# Patient Record
Sex: FEMALE | Race: WHITE | NOT HISPANIC OR LATINO | Employment: OTHER | URBAN - METROPOLITAN AREA
[De-identification: names, ages, dates, MRNs, and addresses within clinical notes are randomized per-mention and may not be internally consistent; named-entity substitution may affect disease eponyms.]

---

## 2018-11-11 ENCOUNTER — HOSPITAL ENCOUNTER (EMERGENCY)
Facility: HOSPITAL | Age: 63
Discharge: ANOTHER HOSPITAL | End: 2018-11-11
Attending: EMERGENCY MEDICINE
Payer: COMMERCIAL

## 2018-11-11 ENCOUNTER — APPOINTMENT (EMERGENCY)
Dept: RADIOLOGY | Facility: HOSPITAL | Age: 63
End: 2018-11-11
Payer: COMMERCIAL

## 2018-11-11 ENCOUNTER — APPOINTMENT (EMERGENCY)
Dept: RADIOLOGY | Facility: HOSPITAL | Age: 63
End: 2018-11-11
Attending: EMERGENCY MEDICINE
Payer: COMMERCIAL

## 2018-11-11 VITALS
BODY MASS INDEX: 29.95 KG/M2 | SYSTOLIC BLOOD PRESSURE: 195 MMHG | RESPIRATION RATE: 18 BRPM | WEIGHT: 169 LBS | DIASTOLIC BLOOD PRESSURE: 79 MMHG | TEMPERATURE: 98.8 F | OXYGEN SATURATION: 96 % | HEIGHT: 63 IN | HEART RATE: 68 BPM

## 2018-11-11 DIAGNOSIS — R52 PAIN: ICD-10-CM

## 2018-11-11 DIAGNOSIS — S72.001A CLOSED RIGHT HIP FRACTURE, INITIAL ENCOUNTER (CMS/HCC): ICD-10-CM
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
## 2018-11-11 LAB
ALBUMIN SERPL-MCNC: 4.1 G/DL (ref 3.4–5)
ALP SERPL-CCNC: 71 IU/L (ref 35–126)
ALT SERPL-CCNC: 18 IU/L (ref 11–54)
ANION GAP SERPL CALC-SCNC: 9 MEQ/L (ref 3–15)
AST SERPL-CCNC: 26 IU/L (ref 15–41)
BASOPHILS # BLD: 0.03 K/UL (ref 0.01–0.1)
BASOPHILS NFR BLD: 0.4 %
BILIRUB SERPL-MCNC: 0.7 MG/DL (ref 0.3–1.2)
BUN SERPL-MCNC: 21 MG/DL (ref 8–20)
CALCIUM SERPL-MCNC: 10.2 MG/DL (ref 8.9–10.3)
CHLORIDE SERPL-SCNC: 104 MEQ/L (ref 98–109)
CO2 SERPL-SCNC: 24 MEQ/L (ref 22–32)
CREAT SERPL-MCNC: 0.9 MG/DL (ref 0.6–1.1)
DIFFERENTIAL METHOD BLD: ABNORMAL
EOSINOPHIL # BLD: 0.03 K/UL (ref 0.04–0.36)
EOSINOPHIL NFR BLD: 0.4 %
ERYTHROCYTE [DISTWIDTH] IN BLOOD BY AUTOMATED COUNT: 13.2 % (ref 11.7–14.4)
GFR SERPL CREATININE-BSD FRML MDRD: >60 ML/MIN/1.73M*2
GLUCOSE SERPL-MCNC: 262 MG/DL (ref 70–99)
HCT VFR BLDCO AUTO: 35.3 % (ref 35–45)
HGB BLD-MCNC: 11.4 G/DL (ref 11.8–15.7)
IMM GRANULOCYTES # BLD AUTO: 0.05 K/UL (ref 0–0.08)
IMM GRANULOCYTES NFR BLD AUTO: 0.7 %
LYMPHOCYTES # BLD: 1.1 K/UL (ref 1.2–3.5)
LYMPHOCYTES NFR BLD: 15.3 %
MCH RBC QN AUTO: 28.6 PG (ref 28–33.2)
MCHC RBC AUTO-ENTMCNC: 32.3 G/DL (ref 32.2–35.5)
MCV RBC AUTO: 88.5 FL (ref 83–98)
MONOCYTES # BLD: 0.52 K/UL (ref 0.28–0.8)
MONOCYTES NFR BLD: 7.3 %
NEUTROPHILS # BLD: 5.44 K/UL (ref 1.7–7)
NEUTS SEG NFR BLD: 75.9 %
NRBC BLD-RTO: 0 %
PDW BLD AUTO: 10.1 FL (ref 9.4–12.3)
PLATELET # BLD AUTO: 154 K/UL (ref 150–369)
POTASSIUM SERPL-SCNC: 3.7 MEQ/L (ref 3.6–5.1)
PROT SERPL-MCNC: 7.2 G/DL (ref 6–8.2)
RBC # BLD AUTO: 3.99 M/UL (ref 3.93–5.22)
SODIUM SERPL-SCNC: 137 MEQ/L (ref 136–144)
WBC # BLD AUTO: 7.17 K/UL (ref 3.8–10.5)

## 2018-11-11 PROCEDURE — 80053 COMPREHEN METABOLIC PANEL: CPT

## 2018-11-11 PROCEDURE — 96374 THER/PROPH/DIAG INJ IV PUSH: CPT

## 2018-11-11 PROCEDURE — 99283 EMERGENCY DEPT VISIT LOW MDM: CPT | Mod: 25

## 2018-11-11 PROCEDURE — 85025 COMPLETE CBC W/AUTO DIFF WBC: CPT | Performed by: EMERGENCY MEDICINE

## 2018-11-11 PROCEDURE — 73562 X-RAY EXAM OF KNEE 3: CPT | Mod: RT

## 2018-11-11 PROCEDURE — 93005 ELECTROCARDIOGRAM TRACING: CPT | Performed by: PHYSICIAN ASSISTANT

## 2018-11-11 PROCEDURE — 36415 COLL VENOUS BLD VENIPUNCTURE: CPT

## 2018-11-11 PROCEDURE — 85025 COMPLETE CBC W/AUTO DIFF WBC: CPT

## 2018-11-11 PROCEDURE — 73502 X-RAY EXAM HIP UNI 2-3 VIEWS: CPT | Mod: RT

## 2018-11-11 PROCEDURE — 71045 X-RAY EXAM CHEST 1 VIEW: CPT

## 2018-11-11 PROCEDURE — 96375 TX/PRO/DX INJ NEW DRUG ADDON: CPT

## 2018-11-11 PROCEDURE — 63600000 HC DRUGS/DETAIL CODE: Performed by: PHYSICIAN ASSISTANT

## 2018-11-11 PROCEDURE — 96376 TX/PRO/DX INJ SAME DRUG ADON: CPT

## 2018-11-11 PROCEDURE — 63700000 HC SELF-ADMINISTRABLE DRUG: Performed by: PHYSICIAN ASSISTANT

## 2018-11-11 PROCEDURE — 27200121 INSERT URINARY CATHETER: Performed by: EMERGENCY MEDICINE

## 2018-11-11 PROCEDURE — 3E033NZ INTRODUCTION OF ANALGESICS, HYPNOTICS, SEDATIVES INTO PERIPHERAL VEIN, PERCUTANEOUS APPROACH: ICD-10-PCS | Performed by: EMERGENCY MEDICINE

## 2018-11-11 PROCEDURE — 80053 COMPREHEN METABOLIC PANEL: CPT | Performed by: EMERGENCY MEDICINE

## 2018-11-11 PROCEDURE — 73564 X-RAY EXAM KNEE 4 OR MORE: CPT | Mod: RT

## 2018-11-11 RX ORDER — FLUOXETINE HYDROCHLORIDE 20 MG/1
20 CAPSULE ORAL DAILY
COMMUNITY

## 2018-11-11 RX ORDER — ASPIRIN 81 MG/1
81 TABLET ORAL DAILY
COMMUNITY

## 2018-11-11 RX ORDER — HYDROMORPHONE HYDROCHLORIDE 1 MG/ML
0.5 INJECTION, SOLUTION INTRAMUSCULAR; INTRAVENOUS; SUBCUTANEOUS ONCE
Status: COMPLETED | OUTPATIENT
Start: 2018-11-11 | End: 2018-11-11

## 2018-11-11 RX ORDER — MYCOPHENOLATE MOFETIL 500 MG/1
500 TABLET ORAL 2 TIMES DAILY
COMMUNITY

## 2018-11-11 RX ORDER — TACROLIMUS 1 MG/1
2 CAPSULE ORAL EVERY EVENING
COMMUNITY

## 2018-11-11 RX ORDER — LOSARTAN POTASSIUM 50 MG/1
50 TABLET ORAL 2 TIMES DAILY
COMMUNITY

## 2018-11-11 RX ORDER — PREDNISONE 5 MG/1
5 TABLET ORAL DAILY
COMMUNITY

## 2018-11-11 RX ORDER — TACROLIMUS 1 MG/1
3 CAPSULE ORAL DAILY
COMMUNITY

## 2018-11-11 RX ORDER — HYDROMORPHONE HYDROCHLORIDE 1 MG/ML
1 INJECTION, SOLUTION INTRAMUSCULAR; INTRAVENOUS; SUBCUTANEOUS ONCE
Status: COMPLETED | OUTPATIENT
Start: 2018-11-11 | End: 2018-11-11

## 2018-11-11 RX ORDER — MORPHINE SULFATE 4 MG/ML
4 INJECTION, SOLUTION INTRAMUSCULAR; INTRAVENOUS ONCE
Status: COMPLETED | OUTPATIENT
Start: 2018-11-11 | End: 2018-11-11

## 2018-11-11 RX ORDER — METHENAMINE HIPPURATE 1000 MG/1
1 TABLET ORAL 2 TIMES DAILY WITH MEALS
COMMUNITY

## 2018-11-11 RX ORDER — BUPRENORPHINE 15 UG/H
1 PATCH TRANSDERMAL
COMMUNITY

## 2018-11-11 RX ORDER — INSULIN GLARGINE 100 [IU]/ML
20 INJECTION, SOLUTION SUBCUTANEOUS 2 TIMES DAILY
COMMUNITY

## 2018-11-11 RX ORDER — PRAVASTATIN SODIUM 20 MG/1
20 TABLET ORAL EVERY EVENING
COMMUNITY

## 2018-11-11 RX ORDER — INSULIN ASPART 100 [IU]/ML
INJECTION, SOLUTION INTRAVENOUS; SUBCUTANEOUS
COMMUNITY

## 2018-11-11 RX ORDER — METOPROLOL SUCCINATE 25 MG/1
12.5 TABLET, EXTENDED RELEASE ORAL EVERY EVENING
COMMUNITY

## 2018-11-11 RX ORDER — LOSARTAN POTASSIUM 50 MG/1
50 TABLET ORAL ONCE
Status: COMPLETED | OUTPATIENT
Start: 2018-11-11 | End: 2018-11-11

## 2018-11-11 RX ORDER — FOLIC ACID 0.4 MG
400 TABLET ORAL DAILY
COMMUNITY

## 2018-11-11 RX ORDER — LANOLIN ALCOHOL/MO/W.PET/CERES
500 CREAM (GRAM) TOPICAL 2 TIMES DAILY
COMMUNITY

## 2018-11-11 RX ORDER — URSODIOL 300 MG/1
300 CAPSULE ORAL EVERY 8 HOURS
COMMUNITY

## 2018-11-11 RX ORDER — OMEPRAZOLE 20 MG/1
40 CAPSULE, DELAYED RELEASE ORAL
COMMUNITY

## 2018-11-11 RX ORDER — FUROSEMIDE 40 MG/1
40 TABLET ORAL DAILY
COMMUNITY

## 2018-11-11 RX ADMIN — HYDROMORPHONE HYDROCHLORIDE 0.5 MG: 1 INJECTION, SOLUTION INTRAMUSCULAR; INTRAVENOUS; SUBCUTANEOUS at 18:42

## 2018-11-11 RX ADMIN — HYDROMORPHONE HYDROCHLORIDE 1 MG: 1 INJECTION, SOLUTION INTRAMUSCULAR; INTRAVENOUS; SUBCUTANEOUS at 19:49

## 2018-11-11 RX ADMIN — MORPHINE SULFATE 4 MG: 4 INJECTION INTRAVENOUS at 22:15

## 2018-11-11 RX ADMIN — MORPHINE SULFATE 4 MG: 4 INJECTION INTRAVENOUS at 20:51

## 2018-11-11 RX ADMIN — LOSARTAN POTASSIUM 50 MG: 50 TABLET, FILM COATED ORAL at 22:14

## 2018-11-11 ASSESSMENT — ENCOUNTER SYMPTOMS
BACK PAIN: 0
NUMBNESS: 0
ABDOMINAL PAIN: 0
LIGHT-HEADEDNESS: 0
FACIAL SWELLING: 0
ARTHRALGIAS: 1
SPEECH DIFFICULTY: 0
NAUSEA: 0
NECK PAIN: 0
MYALGIAS: 1
SHORTNESS OF BREATH: 0
DIZZINESS: 0
NECK STIFFNESS: 0
COLOR CHANGE: 0
WEAKNESS: 0

## 2018-11-11 NOTE — ED PROVIDER NOTES
"HPI     Chief Complaint   Patient presents with   • Fall     This is a 64 yo F who presents to the ED after a fall. Pt has a hx of breast cancer, HTN, liver and kidney transplants, and type 2 DM. Pt reports she was attempting to sit down in her chair when the chair slid out before she sat, causing her to fall onto her right side. Pt notes she heard a \"crack\" in her right hip. Pt denies head injury or LOC at that time. Pt was not able to get up on her own or ambulate after the fall d/t pain. Pt was brought to the ED via EMS. Pt arrives c/o right hip pain that radiates down her right leg to her right knee. Denies numbness, weakness, back pain, neck pain, abdominal pain. No other injuries or complaints. Pt has never previously injured her right hip or right knee; no previous leg surgery.         History provided by:  Patient   used: No         Patient History     Past Medical History:   Diagnosis Date   • Breast cancer (CMS/HCC) (HCC)    • Hypertension    • Kidney transplant recipient    • Lipid disorder    • Liver transplant recipient (CMS/HCC) (HCC)    • Liver transplant rejection (CMS/HCC) (HCC)    • Type 2 diabetes mellitus (CMS/HCC) (HCC)        Past Surgical History:   Procedure Laterality Date   • CHOLECYSTECTOMY     • KIDNEY TRANSPLANT     • LIVER TRANSPLANTATION     • MASTECTOMY         History reviewed. No pertinent family history.    Social History   Substance Use Topics   • Smoking status: Never Smoker   • Smokeless tobacco: Never Used   • Alcohol use No       Systems Reviewed from Nursing Triage:          Review of Systems     Review of Systems   HENT: Negative for dental problem, facial swelling and nosebleeds.         No head injury.   Eyes: Negative for visual disturbance.   Respiratory: Negative for chest tightness and shortness of breath.    Cardiovascular: Negative for chest pain.   Gastrointestinal: Negative for abdominal pain, nausea and vomiting.   Musculoskeletal: Positive for " "arthralgias, gait problem and myalgias. Negative for back pain, neck pain and neck stiffness.   Skin: Negative for color change, pallor, rash and wound.   Neurological: Negative for dizziness, speech difficulty, weakness, light-headedness and numbness.        No LOC.   All other systems reviewed and are negative.       Physical Exam     ED Triage Vitals [11/11/18 1805]   Temp Heart Rate Resp BP SpO2   36.9 °C (98.5 °F) 60 18 (!) 213/85 97 %      Temp Source Heart Rate Source Patient Position BP Location FiO2 (%) (Set)   Temporal -- -- -- --                     Patient Vitals for the past 24 hrs:   BP Temp Temp src Pulse Resp SpO2 Height Weight   11/11/18 1805 (!) 213/85 36.9 °C (98.5 °F) Temporal 60 18 97 % 1.6 m (5' 3\") 76.7 kg (169 lb)           Physical Exam   Constitutional: She is oriented to person, place, and time. She appears well-developed and well-nourished. She appears distressed (appears in pain).   HENT:   Head: Normocephalic and atraumatic.   Right Ear: External ear normal.   Left Ear: External ear normal.   Nose: Nose normal.   Eyes: Conjunctivae are normal. Pupils are equal, round, and reactive to light.   Neck: Normal range of motion.   Cardiovascular: Normal rate, regular rhythm and intact distal pulses.    Murmur heard.  Pulses:       Dorsalis pedis pulses are 2+ on the right side, and 2+ on the left side.   Pulmonary/Chest: Effort normal and breath sounds normal. No respiratory distress. She has no wheezes.   Abdominal: Soft. There is no tenderness.   Musculoskeletal: She exhibits tenderness.        Right hip: She exhibits decreased range of motion and tenderness.        Right knee: Normal. No tenderness found.        Right ankle: Normal. No tenderness.   RLE is externally rotated.   Right hip tenderness.   Distal sensation intact.   +1 pedal pulses bilaterally.  No right knee or right ankle tenderness.   Neurological: She is alert and oriented to person, place, and time. No sensory deficit. "   Skin: Skin is warm and dry. No rash noted.   Psychiatric: She has a normal mood and affect. Her behavior is normal. Judgment and thought content normal.   Nursing note and vitals reviewed.           Procedures    ED Course & MDM     Labs Reviewed   CBC AND DIFFERENTIAL    Narrative:     The following orders were created for panel order CBC and differential.  Procedure                               Abnormality         Status                     ---------                               -----------         ------                     CBC[[id number]]                                                                          Diff Count[[id number]]                                                                     Please view results for these tests on the individual orders.   COMPREHENSIVE METABOLIC PANEL   RAINBOW DRAW PANEL    Narrative:     The following orders were created for panel order [place] Draw Panel.  Procedure                               Abnormality         Status                     ---------                               -----------         ------                     RAINBOW RED[[id number]]                                                                  RAINBOW LT BLUE[[id number]]                                                              RAINBOW LT GREEN[[id number]]                                                             RAINBOW GOLD[[id number]]                                                                   Please view results for these tests on the individual orders.   CBC   DIFF COUNT   RAINBOW RED   RAINBOW LT BLUE   RAINBOW LT GREEN   RAINBOW GOLD       No orders to display               MDM  Number of Diagnoses or Management Options  Closed right hip fracture, initial encounter (CMS/[facility]) ([facility]):   Fall, initial encounter:      Amount and/or Complexity of Data Reviewed  Clinical lab tests: reviewed  Discuss the patient with other providers: yes  Independent visualization of images, tracings, or  specimens: yes    Risk of Complications, Morbidity, and/or Mortality  Presenting problems: moderate  Diagnostic procedures: moderate  Management options: moderate    Patient Progress  Patient progress: stable         I: R hip/leg injury s/p fall   P: labs, XR, dilaudid for pain, observe  6:44 PM     XR shows comminuted displaced femoral neck fracture. Reviewed w/Dr. Guerrero, will give 1mg of dilaudid.   7:40 PM     Clinical Impressions as of Nov 11 2203   Fall, initial encounter   Closed right hip fracture, initial encounter (CMS/[facility]) ([facility])      Pt is requesting transfer to [facility]. States she had transplants at [place] but all of her care is at [place] now. Will contact transfer center.   8:00 PM     Pt accepted by Dr. Gomez on the trauma service at [facility] at [facility]. Pt informed of transport time of 1045 and family and pt are in agreement w/plan. Will give morphine for pain.   8:34 PM     Pt continues to be hypertensive, will give home dose of cozaar 50mg and 4mg of morphine. Will continue to monitor while awaiting transport.   10:03 PM     Spoke with [facility] transfer center, [facility] present in ED to transport pt at this time.   10:52 PM     By signing my name below, I, Teagan Loera, attest that this documentation has been prepared under the direction and in the presence of MG Light PA-C.  11/11/2018 6:38 PM    I, Maggi Light, personally performed the services described in this documentation, as documented by the scribe in my presence, and it is both accurate and complete.  11/12/2018 12:59 AM       Teagan Loera  11/11/18 1846       Maggi Light PA C  11/12/18 0102       Maggi Light PA C  11/12/18 0103

## 2018-11-11 NOTE — ED PROVIDER NOTES
"HPI     Chief Complaint   Patient presents with   • Fall       HPI     Patient History     Past Medical History:   Diagnosis Date   • Breast cancer (CMS/HCC) (HCC)    • Hypertension    • Kidney transplant recipient    • Lipid disorder    • Liver transplant recipient (CMS/HCC) (HCC)    • Liver transplant rejection (CMS/HCC) (HCC)    • Type 2 diabetes mellitus (CMS/HCC) (HCC)        Past Surgical History:   Procedure Laterality Date   • CHOLECYSTECTOMY     • KIDNEY TRANSPLANT     • LIVER TRANSPLANTATION     • MASTECTOMY         History reviewed. No pertinent family history.    Social History   Substance Use Topics   • Smoking status: Never Smoker   • Smokeless tobacco: Never Used   • Alcohol use No       Systems Reviewed from Nursing Triage:          Review of Systems     Review of Systems     Physical Exam     ED Triage Vitals [11/11/18 1805]   Temp Heart Rate Resp BP SpO2   36.9 °C (98.5 °F) 60 18 (!) 213/85 97 %      Temp Source Heart Rate Source Patient Position BP Location FiO2 (%) (Set)   Temporal -- -- -- --                     Patient Vitals for the past 24 hrs:   BP Temp Temp src Pulse Resp SpO2 Height Weight   11/11/18 1805 (!) 213/85 36.9 °C (98.5 °F) Temporal 60 18 97 % 1.6 m (5' 3\") 76.7 kg (169 lb)           Physical Exam         Procedures    ED Course & MDM     Labs Reviewed   CBC - Abnormal        Result Value    WBC 7.17      RBC 3.99      Hemoglobin 11.4 (*)     Hematocrit 35.3      MCV 88.5      MCH 28.6      MCHC 32.3      RDW 13.2      Platelets 154      MPV 10.1     DIFF COUNT - Abnormal     Differential Type Auto      nRBC 0.0      Immature Granulocytes 0.7      Neutrophils 75.9      Lymphocytes 15.3      Monocytes 7.3      Eosinophils 0.4      Basophils 0.4      Immature Granulocytes, Absolute 0.05      Neutrophils, Absolute 5.44      Lymphocytes, Absolute 1.10 (*)     Monocytes, Absolute 0.52      Eosinophils, Absolute 0.03 (*)     Basophils, Absolute 0.03     CBC AND DIFFERENTIAL    " Narrative:     The following orders were created for panel order CBC and differential.  Procedure                               Abnormality         Status                     ---------                               -----------         ------                     CBC[[id number]]                           Abnormal            Final result               Diff Count[[id number]]                    Abnormal            Final result                 Please view results for these tests on the individual orders.   COMPREHENSIVE METABOLIC PANEL   RAINBOW DRAW PANEL    Narrative:     The following orders were created for panel order [place] Draw Panel.  Procedure                               Abnormality         Status                     ---------                               -----------         ------                     RAINBOW RED[[id number]]                                       In process                 RAINBOW LT BLUE[[id number]]                                   In process                 RAINBOW LT GREEN[[id number]]                                  In process                 RAINBOW GOLD[[id number]]                                      In process                   Please view results for these tests on the individual orders.   RAINBOW RED   RAINBOW LT BLUE   RAINBOW LT GREEN   RAINBOW GOLD       X-RAY KNEE RIGHT 4+ VIEWS    (Results Pending)   X-RAY HIP WITH OR WITHOUT PELVIS 2-3 VW RIGHT    (Results Pending)               MDM        I: R hip/leg injury s/p fall   P: labs, XR, dilaudid for pain, observe  6:44 PM

## 2018-11-12 LAB
ATRIAL RATE: 62
P AXIS: 70
PR INTERVAL: 154
QRS DURATION: 82
QT INTERVAL: 462
QTC CALCULATION(BAZETT): 468
R AXIS: 25
RAINBOW HOLD SPECIMEN: NORMAL
T WAVE AXIS: 32
VENTRICULAR RATE: 62

## 2018-11-12 ASSESSMENT — ENCOUNTER SYMPTOMS
CHEST TIGHTNESS: 0
WOUND: 0
VOMITING: 0

## 2018-11-12 NOTE — ED ATTESTATION NOTE
11/11/20188:28 PM  I have personally seen and examined the patient.  I reviewed and agree with the PA/NP/Resident's assessment and plan of care.    My examination, assessment, and plan of care of Grisel Boston is as follows:  The patient presents with fall and right hip pain.  Patient states she has a history of breast cancer as well as a liver and renal transplant.  These 2 transplants were done at [place] and she is cared for by specialists at [facility] in [place].  She states that she was trying undresses at [employer] and she fell off of the bench in the changing room and landed on her right hip.  She was unable to ambulate thereafter.  She banged her right arm but her arm is moving well.  She has no other injuries.      Exam: Is awake alert and oriented x3 and in moderate distress secondary to right hip pain.  Heart is regular with no murmurs.  Lungs are clear to auscultation bilaterally.  Abdomen is soft and nontender with good bowel sounds.  Pelvis is stable.  The right and left upper extremity move well and has no pain with palpation.  The right lower extremity has pain in the right hip with soft tissue swelling and pain with range of motion.  There is no open area.  The knee tib-fib ankle and foot are all nontender.  The distal neurovascular exam is normal.  The right lower extremity is shortened and externally rotated.      Impression/Plan: Patient sustained a fall with a comminuted right hip fracture for which she will need hospital admission and ORIF.  At this time we had placed a call to orthopedics and were planning on admitting to [facility] here however the patient requests to be transferred to [facility].  We will make several phone calls to arrange transportation to [facility] where she can be cared for by Dr's  known to her.     I was physically present for the key/critical portions of the following procedures: None     Obed Guerrero,   11/11/18 2031

## 2020-12-08 ENCOUNTER — APPOINTMENT (EMERGENCY)
Dept: RADIOLOGY | Facility: HOSPITAL | Age: 65
End: 2020-12-08

## 2020-12-08 ENCOUNTER — HOSPITAL ENCOUNTER (EMERGENCY)
Facility: HOSPITAL | Age: 65
Discharge: ANOTHER HOSPITAL | End: 2020-12-08
Attending: EMERGENCY MEDICINE | Admitting: EMERGENCY MEDICINE
Payer: COMMERCIAL

## 2020-12-08 VITALS
DIASTOLIC BLOOD PRESSURE: 79 MMHG | TEMPERATURE: 97.6 F | RESPIRATION RATE: 18 BRPM | HEART RATE: 69 BPM | OXYGEN SATURATION: 97 % | SYSTOLIC BLOOD PRESSURE: 167 MMHG

## 2020-12-08 DIAGNOSIS — R55 PRE-SYNCOPE: ICD-10-CM

## 2020-12-08 DIAGNOSIS — R03.0 ELEVATED BLOOD PRESSURE READING: ICD-10-CM

## 2020-12-08 DIAGNOSIS — I48.91 ATRIAL FIBRILLATION WITH RAPID VENTRICULAR RESPONSE (HCC): ICD-10-CM

## 2020-12-08 DIAGNOSIS — I95.9 HYPOTENSION: ICD-10-CM

## 2020-12-08 DIAGNOSIS — R53.1 GENERALIZED WEAKNESS: Primary | ICD-10-CM

## 2020-12-08 LAB
ALBUMIN SERPL BCP-MCNC: 3.6 G/DL (ref 3.5–5)
ALP SERPL-CCNC: 80 U/L (ref 46–116)
ALT SERPL W P-5'-P-CCNC: 19 U/L (ref 12–78)
ANION GAP SERPL CALCULATED.3IONS-SCNC: 9 MMOL/L (ref 4–13)
AST SERPL W P-5'-P-CCNC: 18 U/L (ref 5–45)
ATRIAL RATE: 117 BPM
ATRIAL RATE: 271 BPM
BACTERIA UR QL AUTO: ABNORMAL /HPF
BASOPHILS # BLD AUTO: 0.01 THOUSANDS/ΜL (ref 0–0.1)
BASOPHILS NFR BLD AUTO: 0 % (ref 0–1)
BILIRUB SERPL-MCNC: 0.3 MG/DL (ref 0.2–1)
BILIRUB UR QL STRIP: NEGATIVE
BUN SERPL-MCNC: 32 MG/DL (ref 5–25)
CALCIUM SERPL-MCNC: 9.5 MG/DL (ref 8.3–10.1)
CHLORIDE SERPL-SCNC: 101 MMOL/L (ref 100–108)
CLARITY UR: CLEAR
CO2 SERPL-SCNC: 25 MMOL/L (ref 21–32)
COLOR UR: YELLOW
CREAT SERPL-MCNC: 1.1 MG/DL (ref 0.6–1.3)
EOSINOPHIL # BLD AUTO: 0.01 THOUSAND/ΜL (ref 0–0.61)
EOSINOPHIL NFR BLD AUTO: 0 % (ref 0–6)
ERYTHROCYTE [DISTWIDTH] IN BLOOD BY AUTOMATED COUNT: 14.7 % (ref 11.6–15.1)
GFR SERPL CREATININE-BSD FRML MDRD: 52 ML/MIN/1.73SQ M
GLUCOSE SERPL-MCNC: 177 MG/DL (ref 65–140)
GLUCOSE UR STRIP-MCNC: NEGATIVE MG/DL
HCT VFR BLD AUTO: 39.1 % (ref 34.8–46.1)
HGB BLD-MCNC: 12.2 G/DL (ref 11.5–15.4)
HGB UR QL STRIP.AUTO: ABNORMAL
IMM GRANULOCYTES # BLD AUTO: 0.02 THOUSAND/UL (ref 0–0.2)
IMM GRANULOCYTES NFR BLD AUTO: 1 % (ref 0–2)
KETONES UR STRIP-MCNC: NEGATIVE MG/DL
LEUKOCYTE ESTERASE UR QL STRIP: ABNORMAL
LYMPHOCYTES # BLD AUTO: 0.82 THOUSANDS/ΜL (ref 0.6–4.47)
LYMPHOCYTES NFR BLD AUTO: 20 % (ref 14–44)
MAGNESIUM SERPL-MCNC: 1.7 MG/DL (ref 1.6–2.6)
MCH RBC QN AUTO: 27.5 PG (ref 26.8–34.3)
MCHC RBC AUTO-ENTMCNC: 31.2 G/DL (ref 31.4–37.4)
MCV RBC AUTO: 88 FL (ref 82–98)
MONOCYTES # BLD AUTO: 0.35 THOUSAND/ΜL (ref 0.17–1.22)
MONOCYTES NFR BLD AUTO: 8 % (ref 4–12)
NEUTROPHILS # BLD AUTO: 2.96 THOUSANDS/ΜL (ref 1.85–7.62)
NEUTS SEG NFR BLD AUTO: 71 % (ref 43–75)
NITRITE UR QL STRIP: NEGATIVE
NON-SQ EPI CELLS URNS QL MICRO: ABNORMAL /HPF
NRBC BLD AUTO-RTO: 0 /100 WBCS
OTHER STN SPEC: ABNORMAL
PH UR STRIP.AUTO: 6 [PH]
PLATELET # BLD AUTO: 113 THOUSANDS/UL (ref 149–390)
PMV BLD AUTO: 11 FL (ref 8.9–12.7)
POTASSIUM SERPL-SCNC: 3.6 MMOL/L (ref 3.5–5.3)
PROT SERPL-MCNC: 7.7 G/DL (ref 6.4–8.2)
PROT UR STRIP-MCNC: ABNORMAL MG/DL
QRS AXIS: 39 DEGREES
QRS AXIS: 48 DEGREES
QRSD INTERVAL: 100 MS
QRSD INTERVAL: 82 MS
QT INTERVAL: 326 MS
QT INTERVAL: 380 MS
QTC INTERVAL: 462 MS
QTC INTERVAL: 530 MS
RBC # BLD AUTO: 4.43 MILLION/UL (ref 3.81–5.12)
RBC #/AREA URNS AUTO: ABNORMAL /HPF
SODIUM SERPL-SCNC: 135 MMOL/L (ref 136–145)
SP GR UR STRIP.AUTO: 1.01 (ref 1–1.03)
T WAVE AXIS: -29 DEGREES
T WAVE AXIS: -72 DEGREES
TROPONIN I SERPL-MCNC: 0.04 NG/ML
TSH SERPL DL<=0.05 MIU/L-ACNC: 2.47 UIU/ML (ref 0.36–3.74)
UROBILINOGEN UR QL STRIP.AUTO: 0.2 E.U./DL
VENTRICULAR RATE: 117 BPM
VENTRICULAR RATE: 121 BPM
WBC # BLD AUTO: 4.17 THOUSAND/UL (ref 4.31–10.16)
WBC #/AREA URNS AUTO: ABNORMAL /HPF

## 2020-12-08 PROCEDURE — 71045 X-RAY EXAM CHEST 1 VIEW: CPT

## 2020-12-08 PROCEDURE — 83735 ASSAY OF MAGNESIUM: CPT | Performed by: EMERGENCY MEDICINE

## 2020-12-08 PROCEDURE — 80053 COMPREHEN METABOLIC PANEL: CPT | Performed by: EMERGENCY MEDICINE

## 2020-12-08 PROCEDURE — 81001 URINALYSIS AUTO W/SCOPE: CPT | Performed by: EMERGENCY MEDICINE

## 2020-12-08 PROCEDURE — 85025 COMPLETE CBC W/AUTO DIFF WBC: CPT | Performed by: EMERGENCY MEDICINE

## 2020-12-08 PROCEDURE — 99285 EMERGENCY DEPT VISIT HI MDM: CPT

## 2020-12-08 PROCEDURE — 87086 URINE CULTURE/COLONY COUNT: CPT | Performed by: EMERGENCY MEDICINE

## 2020-12-08 PROCEDURE — 96360 HYDRATION IV INFUSION INIT: CPT

## 2020-12-08 PROCEDURE — 84443 ASSAY THYROID STIM HORMONE: CPT | Performed by: EMERGENCY MEDICINE

## 2020-12-08 PROCEDURE — 93005 ELECTROCARDIOGRAM TRACING: CPT

## 2020-12-08 PROCEDURE — 93010 ELECTROCARDIOGRAM REPORT: CPT | Performed by: INTERNAL MEDICINE

## 2020-12-08 PROCEDURE — 36415 COLL VENOUS BLD VENIPUNCTURE: CPT | Performed by: EMERGENCY MEDICINE

## 2020-12-08 PROCEDURE — 87186 SC STD MICRODIL/AGAR DIL: CPT | Performed by: EMERGENCY MEDICINE

## 2020-12-08 PROCEDURE — 99285 EMERGENCY DEPT VISIT HI MDM: CPT | Performed by: EMERGENCY MEDICINE

## 2020-12-08 PROCEDURE — 87077 CULTURE AEROBIC IDENTIFY: CPT | Performed by: EMERGENCY MEDICINE

## 2020-12-08 PROCEDURE — 84484 ASSAY OF TROPONIN QUANT: CPT | Performed by: EMERGENCY MEDICINE

## 2020-12-08 RX ORDER — TACROLIMUS 1 MG/1
3 CAPSULE ORAL
COMMUNITY
Start: 2020-07-01

## 2020-12-08 RX ORDER — BENZONATATE 100 MG/1
100 CAPSULE ORAL 3 TIMES DAILY PRN
COMMUNITY
Start: 2020-12-04 | End: 2020-12-14

## 2020-12-08 RX ORDER — LOSARTAN POTASSIUM 100 MG/1
25 TABLET ORAL DAILY
COMMUNITY
Start: 2020-12-04

## 2020-12-08 RX ORDER — METHENAMINE HIPPURATE 1000 MG/1
1 TABLET ORAL 2 TIMES DAILY
COMMUNITY
Start: 2020-11-10

## 2020-12-08 RX ORDER — BUPRENORPHINE 15 UG/H
15 PATCH TRANSDERMAL WEEKLY
COMMUNITY
Start: 2020-11-25

## 2020-12-08 RX ORDER — LOSARTAN POTASSIUM 25 MG/1
25 TABLET ORAL ONCE
Status: COMPLETED | OUTPATIENT
Start: 2020-12-08 | End: 2020-12-08

## 2020-12-08 RX ORDER — HYDRALAZINE HYDROCHLORIDE 20 MG/ML
5 INJECTION INTRAMUSCULAR; INTRAVENOUS ONCE
Status: DISCONTINUED | OUTPATIENT
Start: 2020-12-08 | End: 2020-12-08

## 2020-12-08 RX ORDER — CALCIUM CARBONATE/VITAMIN D3 500-10/5ML
500 LIQUID (ML) ORAL DAILY
COMMUNITY

## 2020-12-08 RX ORDER — GUAIFENESIN 600 MG
600 TABLET, EXTENDED RELEASE 12 HR ORAL 2 TIMES DAILY
COMMUNITY
Start: 2020-12-04 | End: 2020-12-09

## 2020-12-08 RX ORDER — OXYCODONE HYDROCHLORIDE 5 MG/1
5 TABLET ORAL EVERY 4 HOURS PRN
COMMUNITY
Start: 2020-08-06

## 2020-12-08 RX ORDER — METOPROLOL TARTRATE 50 MG/1
125 TABLET, FILM COATED ORAL 2 TIMES DAILY
COMMUNITY
Start: 2020-12-04 | End: 2021-01-03

## 2020-12-08 RX ORDER — OMEPRAZOLE 40 MG/1
40 CAPSULE, DELAYED RELEASE ORAL 2 TIMES DAILY
COMMUNITY
Start: 2020-11-20

## 2020-12-08 RX ORDER — TACROLIMUS 0.5 MG/1
2.5 CAPSULE ORAL
COMMUNITY
Start: 2020-07-01

## 2020-12-08 RX ORDER — FLUOXETINE HYDROCHLORIDE 20 MG/1
20 CAPSULE ORAL DAILY
COMMUNITY
Start: 2020-10-05

## 2020-12-08 RX ORDER — TACROLIMUS 1 MG/1
3 CAPSULE ORAL
Status: DISCONTINUED | OUTPATIENT
Start: 2020-12-08 | End: 2020-12-08 | Stop reason: HOSPADM

## 2020-12-08 RX ORDER — ROSUVASTATIN CALCIUM 20 MG/1
20 TABLET, COATED ORAL DAILY
COMMUNITY
Start: 2020-07-01 | End: 2021-07-01

## 2020-12-08 RX ORDER — URSODIOL 300 MG/1
1 CAPSULE ORAL 3 TIMES DAILY
COMMUNITY
Start: 2020-06-18

## 2020-12-08 RX ORDER — HYDRALAZINE HYDROCHLORIDE 20 MG/ML
5 INJECTION INTRAMUSCULAR; INTRAVENOUS ONCE
Status: DISCONTINUED | OUTPATIENT
Start: 2020-12-08 | End: 2020-12-08 | Stop reason: HOSPADM

## 2020-12-08 RX ORDER — HYDRALAZINE HYDROCHLORIDE 20 MG/ML
10 INJECTION INTRAMUSCULAR; INTRAVENOUS ONCE
Status: DISCONTINUED | OUTPATIENT
Start: 2020-12-08 | End: 2020-12-08

## 2020-12-08 RX ORDER — ASPIRIN 81 MG/1
81 TABLET ORAL DAILY
COMMUNITY

## 2020-12-08 RX ORDER — GABAPENTIN 300 MG/1
300 CAPSULE ORAL 3 TIMES DAILY
COMMUNITY
Start: 2020-09-05

## 2020-12-08 RX ORDER — CLOPIDOGREL BISULFATE 75 MG/1
75 TABLET ORAL DAILY
COMMUNITY
Start: 2020-07-02 | End: 2021-07-02

## 2020-12-08 RX ORDER — PANCRELIPASE 30000; 6000; 19000 [USP'U]/1; [USP'U]/1; [USP'U]/1
CAPSULE, DELAYED RELEASE PELLETS ORAL
COMMUNITY
Start: 2020-10-24

## 2020-12-08 RX ORDER — FUROSEMIDE 40 MG/1
40 TABLET ORAL DAILY
COMMUNITY

## 2020-12-08 RX ORDER — INSULIN GLARGINE 100 [IU]/ML
15 INJECTION, SOLUTION SUBCUTANEOUS 2 TIMES DAILY
COMMUNITY

## 2020-12-08 RX ADMIN — LOSARTAN POTASSIUM 25 MG: 25 TABLET, FILM COATED ORAL at 08:09

## 2020-12-08 RX ADMIN — METOPROLOL TARTRATE 125 MG: 50 TABLET, FILM COATED ORAL at 08:09

## 2020-12-08 RX ADMIN — TACROLIMUS 3 MG: 1 CAPSULE ORAL at 08:09

## 2020-12-08 RX ADMIN — SODIUM CHLORIDE 1000 ML: 0.9 INJECTION, SOLUTION INTRAVENOUS at 02:58

## 2020-12-10 LAB — BACTERIA UR CULT: ABNORMAL
